# Patient Record
Sex: MALE | Race: WHITE | Employment: OTHER | URBAN - METROPOLITAN AREA
[De-identification: names, ages, dates, MRNs, and addresses within clinical notes are randomized per-mention and may not be internally consistent; named-entity substitution may affect disease eponyms.]

---

## 2024-07-10 ENCOUNTER — HOSPITAL ENCOUNTER (INPATIENT)
Age: 69
LOS: 2 days | Discharge: HOME | End: 2024-07-12
Attending: EMERGENCY MEDICINE | Admitting: SURGERY
Payer: MEDICARE

## 2024-07-10 ENCOUNTER — APPOINTMENT (OUTPATIENT)
Dept: CT IMAGING | Age: 69
End: 2024-07-10
Payer: MEDICARE

## 2024-07-10 ENCOUNTER — APPOINTMENT (OUTPATIENT)
Age: 69
End: 2024-07-10
Payer: MEDICARE

## 2024-07-10 DIAGNOSIS — K35.30 ACUTE APPENDICITIS WITH LOCALIZED PERITONITIS AND ABSCESS, WITHOUT GANGRENE OR PERFORATION: Primary | ICD-10-CM

## 2024-07-10 PROBLEM — K37 APPENDICITIS: Status: ACTIVE | Noted: 2024-07-10

## 2024-07-10 LAB
ABO + RH BLD: NORMAL
ALBUMIN SERPL-MCNC: 4.7 G/DL (ref 3.5–5.2)
ALBUMIN/GLOB SERPL: 2 {RATIO} (ref 1–2.5)
ALP SERPL-CCNC: 68 U/L (ref 40–129)
ALT SERPL-CCNC: 19 U/L (ref 10–50)
ANION GAP SERPL CALCULATED.3IONS-SCNC: 11 MMOL/L (ref 9–16)
ARM BAND NUMBER: NORMAL
AST SERPL-CCNC: 35 U/L (ref 10–50)
BASOPHILS # BLD: 0.03 K/UL (ref 0–0.2)
BASOPHILS NFR BLD: 0 % (ref 0–2)
BILIRUB SERPL-MCNC: 1.8 MG/DL (ref 0–1.2)
BLOOD BANK SAMPLE EXPIRATION: NORMAL
BLOOD GROUP ANTIBODIES SERPL: NEGATIVE
BUN SERPL-MCNC: 15 MG/DL (ref 8–23)
CALCIUM SERPL-MCNC: 9.7 MG/DL (ref 8.6–10.4)
CHLORIDE SERPL-SCNC: 103 MMOL/L (ref 98–107)
CO2 SERPL-SCNC: 22 MMOL/L (ref 20–31)
CREAT SERPL-MCNC: 1.1 MG/DL (ref 0.7–1.2)
ECHO AO ROOT DIAM: 3.3 CM
ECHO AO ROOT INDEX: 1.55 CM/M2
ECHO AV AREA PEAK VELOCITY: 2.9 CM2
ECHO AV AREA VTI: 2.8 CM2
ECHO AV AREA/BSA PEAK VELOCITY: 1.4 CM2/M2
ECHO AV AREA/BSA VTI: 1.3 CM2/M2
ECHO AV MEAN GRADIENT: 2 MMHG
ECHO AV MEAN VELOCITY: 0.7 M/S
ECHO AV PEAK GRADIENT: 5 MMHG
ECHO AV PEAK VELOCITY: 1.1 M/S
ECHO AV VELOCITY RATIO: 0.91
ECHO AV VTI: 19.1 CM
ECHO BSA: 2.15 M2
ECHO LA AREA 2C: 17.4 CM2
ECHO LA AREA 4C: 16.2 CM2
ECHO LA DIAMETER INDEX: 1.6 CM/M2
ECHO LA DIAMETER: 3.4 CM
ECHO LA MAJOR AXIS: 5.2 CM
ECHO LA MINOR AXIS: 4.9 CM
ECHO LA TO AORTIC ROOT RATIO: 1.03
ECHO LA VOL BP: 45 ML (ref 18–58)
ECHO LA VOL MOD A2C: 51 ML (ref 18–58)
ECHO LA VOL MOD A4C: 38 ML (ref 18–58)
ECHO LA VOL/BSA BIPLANE: 21 ML/M2 (ref 16–34)
ECHO LA VOLUME INDEX MOD A2C: 24 ML/M2 (ref 16–34)
ECHO LA VOLUME INDEX MOD A4C: 18 ML/M2 (ref 16–34)
ECHO LV E' LATERAL VELOCITY: 8 CM/S
ECHO LV E' SEPTAL VELOCITY: 7 CM/S
ECHO LV EDV A2C: 73 ML
ECHO LV EDV A4C: 79 ML
ECHO LV EDV INDEX A4C: 37 ML/M2
ECHO LV EDV NDEX A2C: 34 ML/M2
ECHO LV EJECTION FRACTION A2C: 73 %
ECHO LV EJECTION FRACTION A4C: 51 %
ECHO LV EJECTION FRACTION BIPLANE: 65 % (ref 55–100)
ECHO LV ESV A2C: 20 ML
ECHO LV ESV A4C: 39 ML
ECHO LV ESV INDEX A2C: 9 ML/M2
ECHO LV ESV INDEX A4C: 18 ML/M2
ECHO LV FRACTIONAL SHORTENING: 10 % (ref 28–44)
ECHO LV INTERNAL DIMENSION DIASTOLE INDEX: 1.97 CM/M2
ECHO LV INTERNAL DIMENSION DIASTOLIC: 4.2 CM (ref 4.2–5.9)
ECHO LV INTERNAL DIMENSION SYSTOLIC INDEX: 1.78 CM/M2
ECHO LV INTERNAL DIMENSION SYSTOLIC: 3.8 CM
ECHO LV IVSD: 1 CM (ref 0.6–1)
ECHO LV MASS 2D: 127.8 G (ref 88–224)
ECHO LV MASS INDEX 2D: 60 G/M2 (ref 49–115)
ECHO LV POSTERIOR WALL DIASTOLIC: 0.9 CM (ref 0.6–1)
ECHO LV RELATIVE WALL THICKNESS RATIO: 0.43
ECHO LVOT AREA: 3.1 CM2
ECHO LVOT AV VTI INDEX: 0.88
ECHO LVOT DIAM: 2 CM
ECHO LVOT MEAN GRADIENT: 2 MMHG
ECHO LVOT PEAK GRADIENT: 4 MMHG
ECHO LVOT PEAK VELOCITY: 1 M/S
ECHO LVOT STROKE VOLUME INDEX: 24.8 ML/M2
ECHO LVOT SV: 52.8 ML
ECHO LVOT VTI: 16.8 CM
ECHO MV A VELOCITY: 0.71 M/S
ECHO MV AREA VTI: 3 CM2
ECHO MV E DECELERATION TIME (DT): 229 MS
ECHO MV E VELOCITY: 0.45 M/S
ECHO MV E/A RATIO: 0.63
ECHO MV E/E' LATERAL: 5.63
ECHO MV E/E' RATIO (AVERAGED): 6.03
ECHO MV E/E' SEPTAL: 6.43
ECHO MV LVOT VTI INDEX: 1.04
ECHO MV MAX VELOCITY: 0.7 M/S
ECHO MV MEAN GRADIENT: 1 MMHG
ECHO MV MEAN VELOCITY: 0.4 M/S
ECHO MV PEAK GRADIENT: 2 MMHG
ECHO MV VTI: 17.4 CM
ECHO PV MAX VELOCITY: 1.4 M/S
ECHO PV PEAK GRADIENT: 8 MMHG
ECHO RA AREA 4C: 13.3 CM2
ECHO RA END SYSTOLIC VOLUME APICAL 4 CHAMBER INDEX BSA: 12 ML/M2
ECHO RA VOLUME: 25 ML
ECHO RV BASAL DIMENSION: 3.5 CM
ECHO RV FREE WALL PEAK S': 18 CM/S
ECHO RV TAPSE: 2 CM (ref 1.7–?)
EOSINOPHIL # BLD: 0.07 K/UL (ref 0–0.44)
EOSINOPHILS RELATIVE PERCENT: 1 % (ref 1–4)
ERYTHROCYTE [DISTWIDTH] IN BLOOD BY AUTOMATED COUNT: 12.8 % (ref 11.8–14.4)
GFR, ESTIMATED: 73 ML/MIN/1.73M2
GLUCOSE SERPL-MCNC: 117 MG/DL (ref 74–99)
HCT VFR BLD AUTO: 42.1 % (ref 40.7–50.3)
HGB BLD-MCNC: 14.3 G/DL (ref 13–17)
IMM GRANULOCYTES # BLD AUTO: 0.05 K/UL (ref 0–0.3)
IMM GRANULOCYTES NFR BLD: 0 %
LACTIC ACID, WHOLE BLOOD: 1.3 MMOL/L (ref 0.7–2.1)
LYMPHOCYTES NFR BLD: 1.66 K/UL (ref 1.1–3.7)
LYMPHOCYTES RELATIVE PERCENT: 11 % (ref 24–43)
MCH RBC QN AUTO: 31.6 PG (ref 25.2–33.5)
MCHC RBC AUTO-ENTMCNC: 34 G/DL (ref 28.4–34.8)
MCV RBC AUTO: 93.1 FL (ref 82.6–102.9)
MONOCYTES NFR BLD: 1.13 K/UL (ref 0.1–1.2)
MONOCYTES NFR BLD: 8 % (ref 3–12)
NEUTROPHILS NFR BLD: 80 % (ref 36–65)
NEUTS SEG NFR BLD: 11.81 K/UL (ref 1.5–8.1)
NRBC BLD-RTO: 0 PER 100 WBC
PLATELET # BLD AUTO: 164 K/UL (ref 138–453)
PMV BLD AUTO: 9 FL (ref 8.1–13.5)
POTASSIUM SERPL-SCNC: 4 MMOL/L (ref 3.7–5.3)
PROT SERPL-MCNC: 7.5 G/DL (ref 6.6–8.7)
RBC # BLD AUTO: 4.52 M/UL (ref 4.21–5.77)
SODIUM SERPL-SCNC: 136 MMOL/L (ref 136–145)
WBC OTHER # BLD: 14.8 K/UL (ref 3.5–11.3)

## 2024-07-10 PROCEDURE — 85025 COMPLETE CBC W/AUTO DIFF WBC: CPT

## 2024-07-10 PROCEDURE — 93306 TTE W/DOPPLER COMPLETE: CPT | Performed by: INTERNAL MEDICINE

## 2024-07-10 PROCEDURE — 6360000002 HC RX W HCPCS: Performed by: NURSE PRACTITIONER

## 2024-07-10 PROCEDURE — 6370000000 HC RX 637 (ALT 250 FOR IP): Performed by: NURSE PRACTITIONER

## 2024-07-10 PROCEDURE — 86900 BLOOD TYPING SEROLOGIC ABO: CPT

## 2024-07-10 PROCEDURE — 96375 TX/PRO/DX INJ NEW DRUG ADDON: CPT

## 2024-07-10 PROCEDURE — 36415 COLL VENOUS BLD VENIPUNCTURE: CPT

## 2024-07-10 PROCEDURE — 80053 COMPREHEN METABOLIC PANEL: CPT

## 2024-07-10 PROCEDURE — 74177 CT ABD & PELVIS W/CONTRAST: CPT

## 2024-07-10 PROCEDURE — 2500000003 HC RX 250 WO HCPCS: Performed by: NURSE PRACTITIONER

## 2024-07-10 PROCEDURE — 1200000000 HC SEMI PRIVATE

## 2024-07-10 PROCEDURE — 99222 1ST HOSP IP/OBS MODERATE 55: CPT | Performed by: SURGERY

## 2024-07-10 PROCEDURE — 86850 RBC ANTIBODY SCREEN: CPT

## 2024-07-10 PROCEDURE — 86901 BLOOD TYPING SEROLOGIC RH(D): CPT

## 2024-07-10 PROCEDURE — 93306 TTE W/DOPPLER COMPLETE: CPT

## 2024-07-10 PROCEDURE — 2580000003 HC RX 258: Performed by: NURSE PRACTITIONER

## 2024-07-10 PROCEDURE — 96365 THER/PROPH/DIAG IV INF INIT: CPT

## 2024-07-10 PROCEDURE — 93005 ELECTROCARDIOGRAM TRACING: CPT | Performed by: NURSE PRACTITIONER

## 2024-07-10 PROCEDURE — 6370000000 HC RX 637 (ALT 250 FOR IP)

## 2024-07-10 PROCEDURE — 83605 ASSAY OF LACTIC ACID: CPT

## 2024-07-10 PROCEDURE — 6360000002 HC RX W HCPCS: Performed by: EMERGENCY MEDICINE

## 2024-07-10 PROCEDURE — 6360000004 HC RX CONTRAST MEDICATION: Performed by: EMERGENCY MEDICINE

## 2024-07-10 PROCEDURE — 2580000003 HC RX 258: Performed by: EMERGENCY MEDICINE

## 2024-07-10 PROCEDURE — 99285 EMERGENCY DEPT VISIT HI MDM: CPT

## 2024-07-10 RX ORDER — METOPROLOL SUCCINATE 25 MG/1
25 TABLET, EXTENDED RELEASE ORAL 2 TIMES DAILY
COMMUNITY

## 2024-07-10 RX ORDER — RELUGOLIX 120 MG/1
120 TABLET, FILM COATED ORAL DAILY
COMMUNITY

## 2024-07-10 RX ORDER — MAGNESIUM HYDROXIDE/ALUMINUM HYDROXICE/SIMETHICONE 120; 1200; 1200 MG/30ML; MG/30ML; MG/30ML
30 SUSPENSION ORAL EVERY 6 HOURS PRN
Status: DISCONTINUED | OUTPATIENT
Start: 2024-07-10 | End: 2024-07-12 | Stop reason: HOSPADM

## 2024-07-10 RX ORDER — PREDNISONE 5 MG/1
5 TABLET ORAL DAILY
COMMUNITY

## 2024-07-10 RX ORDER — DEXTROSE MONOHYDRATE, SODIUM CHLORIDE, AND POTASSIUM CHLORIDE 50; 1.49; 4.5 G/1000ML; G/1000ML; G/1000ML
INJECTION, SOLUTION INTRAVENOUS CONTINUOUS
Status: DISCONTINUED | OUTPATIENT
Start: 2024-07-10 | End: 2024-07-12

## 2024-07-10 RX ORDER — FENTANYL CITRATE 50 UG/ML
50 INJECTION, SOLUTION INTRAMUSCULAR; INTRAVENOUS ONCE
Status: COMPLETED | OUTPATIENT
Start: 2024-07-10 | End: 2024-07-10

## 2024-07-10 RX ORDER — ENOXAPARIN SODIUM 100 MG/ML
30 INJECTION SUBCUTANEOUS DAILY
Status: DISCONTINUED | OUTPATIENT
Start: 2024-07-10 | End: 2024-07-12 | Stop reason: HOSPADM

## 2024-07-10 RX ORDER — ACETAMINOPHEN 500 MG
1000 TABLET ORAL EVERY 8 HOURS SCHEDULED
Status: DISCONTINUED | OUTPATIENT
Start: 2024-07-10 | End: 2024-07-12 | Stop reason: HOSPADM

## 2024-07-10 RX ORDER — ASPIRIN 81 MG/1
81 TABLET, CHEWABLE ORAL DAILY
COMMUNITY

## 2024-07-10 RX ORDER — ABIRATERONE 500 MG/1
1000 TABLET ORAL DAILY
Status: DISCONTINUED | OUTPATIENT
Start: 2024-07-10 | End: 2024-07-12 | Stop reason: HOSPADM

## 2024-07-10 RX ORDER — OXYCODONE HYDROCHLORIDE 5 MG/1
5 TABLET ORAL EVERY 4 HOURS PRN
Status: DISCONTINUED | OUTPATIENT
Start: 2024-07-10 | End: 2024-07-12 | Stop reason: HOSPADM

## 2024-07-10 RX ORDER — ROSUVASTATIN CALCIUM 20 MG/1
40 TABLET, COATED ORAL DAILY
Status: DISCONTINUED | OUTPATIENT
Start: 2024-07-10 | End: 2024-07-12 | Stop reason: HOSPADM

## 2024-07-10 RX ORDER — AMLODIPINE BESYLATE 2.5 MG/1
2.5 TABLET ORAL DAILY
COMMUNITY

## 2024-07-10 RX ORDER — SENNA AND DOCUSATE SODIUM 50; 8.6 MG/1; MG/1
1 TABLET, FILM COATED ORAL 2 TIMES DAILY
Status: DISCONTINUED | OUTPATIENT
Start: 2024-07-10 | End: 2024-07-12 | Stop reason: HOSPADM

## 2024-07-10 RX ORDER — HYDROXYZINE HYDROCHLORIDE 10 MG/1
10 TABLET, FILM COATED ORAL ONCE
Status: COMPLETED | OUTPATIENT
Start: 2024-07-10 | End: 2024-07-10

## 2024-07-10 RX ORDER — METOPROLOL SUCCINATE 25 MG/1
25 TABLET, EXTENDED RELEASE ORAL 2 TIMES DAILY
Status: DISCONTINUED | OUTPATIENT
Start: 2024-07-10 | End: 2024-07-12 | Stop reason: HOSPADM

## 2024-07-10 RX ORDER — ABIRATERONE 500 MG/1
1000 TABLET ORAL DAILY
COMMUNITY

## 2024-07-10 RX ORDER — POLYETHYLENE GLYCOL 3350 17 G/17G
17 POWDER, FOR SOLUTION ORAL DAILY
Status: DISCONTINUED | OUTPATIENT
Start: 2024-07-10 | End: 2024-07-12 | Stop reason: HOSPADM

## 2024-07-10 RX ORDER — AMLODIPINE BESYLATE 5 MG/1
2.5 TABLET ORAL DAILY
Status: DISCONTINUED | OUTPATIENT
Start: 2024-07-10 | End: 2024-07-12 | Stop reason: HOSPADM

## 2024-07-10 RX ADMIN — POTASSIUM CHLORIDE, DEXTROSE MONOHYDRATE AND SODIUM CHLORIDE: 150; 5; 450 INJECTION, SOLUTION INTRAVENOUS at 16:19

## 2024-07-10 RX ADMIN — ENOXAPARIN SODIUM 30 MG: 100 INJECTION SUBCUTANEOUS at 21:05

## 2024-07-10 RX ADMIN — FENTANYL CITRATE 50 MCG: 50 INJECTION, SOLUTION INTRAMUSCULAR; INTRAVENOUS at 10:46

## 2024-07-10 RX ADMIN — ACETAMINOPHEN 1000 MG: 500 TABLET ORAL at 16:20

## 2024-07-10 RX ADMIN — IOPAMIDOL 75 ML: 755 INJECTION, SOLUTION INTRAVENOUS at 08:00

## 2024-07-10 RX ADMIN — PIPERACILLIN AND TAZOBACTAM 3375 MG: 3; .375 INJECTION, POWDER, LYOPHILIZED, FOR SOLUTION INTRAVENOUS at 18:58

## 2024-07-10 RX ADMIN — AMLODIPINE BESYLATE 2.5 MG: 5 TABLET ORAL at 16:21

## 2024-07-10 RX ADMIN — METOPROLOL SUCCINATE 25 MG: 25 TABLET, EXTENDED RELEASE ORAL at 16:22

## 2024-07-10 RX ADMIN — ROSUVASTATIN CALCIUM 40 MG: 20 TABLET, FILM COATED ORAL at 21:03

## 2024-07-10 RX ADMIN — PIPERACILLIN AND TAZOBACTAM 3375 MG: 3; .375 INJECTION, POWDER, LYOPHILIZED, FOR SOLUTION INTRAVENOUS at 10:50

## 2024-07-10 RX ADMIN — METOPROLOL SUCCINATE 25 MG: 25 TABLET, EXTENDED RELEASE ORAL at 21:33

## 2024-07-10 RX ADMIN — HYDROXYZINE HYDROCHLORIDE 10 MG: 10 TABLET ORAL at 21:03

## 2024-07-10 ASSESSMENT — PAIN SCALES - GENERAL
PAINLEVEL_OUTOF10: 5
PAINLEVEL_OUTOF10: 4
PAINLEVEL_OUTOF10: 5

## 2024-07-10 ASSESSMENT — PAIN DESCRIPTION - DESCRIPTORS: DESCRIPTORS: SHARP;PRESSURE

## 2024-07-10 ASSESSMENT — ENCOUNTER SYMPTOMS
BLOOD IN STOOL: 0
ABDOMINAL DISTENTION: 1
RESPIRATORY NEGATIVE: 1
CONSTIPATION: 1
NAUSEA: 1
ABDOMINAL PAIN: 1
EYES NEGATIVE: 1
VOMITING: 0

## 2024-07-10 ASSESSMENT — PAIN - FUNCTIONAL ASSESSMENT: PAIN_FUNCTIONAL_ASSESSMENT: 0-10

## 2024-07-10 ASSESSMENT — PAIN DESCRIPTION - ORIENTATION: ORIENTATION: LOWER;RIGHT

## 2024-07-10 ASSESSMENT — PAIN DESCRIPTION - LOCATION: LOCATION: ABDOMEN

## 2024-07-10 NOTE — ED PROVIDER NOTES
[facility] ED  Emergency Department Encounter  Emergency Medicine      Pt Name:Patrick Gallegos  MRN: [id number]  Birthdate 1955  Date of evaluation: 7/10/24  PCP:  No primary care provider on file.  6:38 AM EDT      CHIEF COMPLAINT       Chief Complaint   Patient presents with    Abdominal Pain     Hx of appendicitis        HISTORY OF PRESENT ILLNESS  (Location/Symptom, Timing/Onset, Context/Setting, Quality, Duration, Modifying Factors, Severity.)      Patrick Gallegos is a 69 y.o. male who presents with h/o prostate CAm h/o v fib arrest in 2021, has cardiac stents, and [employer] AICD in place, present with RLQ abdominal pain for the past day, he did ttake oxycodone for the pain and it is 5/10 at this time, and he also feels abdominal distention, no nausea or vomiting, no fevers.  He does have a h/p appendicitis in 6/11/2024, he was admitted at [place], [place], and the decision was made to treat with abx, and then have him follow up outpatient, which he is scheduled to see surgeon in 2 weeks. Patient reports on vacation in [facility] and started having abdominal pain last night. And continues to today. And thinks it is appendicitis again. He is accompanied by his wife. Patient is on anticoagulated, and takes eliquis which he did not take last night     PAST MEDICAL / SURGICAL / SOCIAL / FAMILY HISTORY      has a past medical history of Appendicitis, Cardiac arrest with ventricular fibrillation (HCC), Coronary artery disease, Heart attack (HCC), History of heart artery stent, HTN (hypertension), and Presence of combination internal cardiac defibrillator (ICD) and pacemaker.       has no past surgical history on file.      Social History     Socioeconomic History    Marital status:      Spouse name: Not on file    Number of children: Not on file    Years of education: Not on file    Highest education level: Not on file   Occupational History    Not on file   Tobacco Use    Smoking status: Never

## 2024-07-10 NOTE — H&P
General Surgery  Consult    PATIENT NAME: Patrick Gallegos  AGE: 69 y.o.  MEDICAL RECORD NO. [id number]  DATE: 7/10/2024  SURGEON: amy  PRIMARY CARE PHYSICIAN: No primary care provider on file.    Patient evaluated at the request of  Dr. decker  Reason for evaluation: appendicitis    Patient information was obtained from patient.  History/Exam limitations: none.    Acute appendicitis  Hx appendicitis treated with antbx  Hx cad, ac, stents  Colonoscopy 10 years ago- colonoscopy scheduled end of moth at his home area   Petey will evaluate need for abscess drainage and antibiotics vs interval appendenctomy   He has clearance for appy from cardiology (see media) as he was going to have intervval appy end of month    HISTORY:   History of Chief Complaint:    Patrick Gallegos is a 69 y.o. male who presents with abd pain. Co of abd pain and bloating x several days.  Pt has had hx of appendicitis with abd pain that first started last december and required hospital stay for iv antibiotics. Pt stated the pain feels similar to then. Appendix is still present at this time. Pt states they are from [place] and are in the are visiting , on eliquis.   last ate was yesterday around 1700      Elevated wbc., bloating, mild constipation      Hx of htn, cardiac arrest and 3 stents placed, pacemaker/defibrillator combo       Past Medical History   has a past medical history of Appendicitis, Cardiac arrest with ventricular fibrillation (HCC), Coronary artery disease, Heart attack (HCC), History of heart artery stent, HTN (hypertension), and Presence of combination internal cardiac defibrillator (ICD) and pacemaker.  Past Surgical History   has no past surgical history on file.  Medications  Prior to Admission medications    Medication Sig Start Date End Date Taking? Authorizing Provider   apixaban (ELIQUIS) 5 MG TABS tablet Take 1 tablet by mouth 2 times daily   Yes Provider, MD Sallie   metoprolol succinate (TOPROL XL) 25 MG extended

## 2024-07-10 NOTE — PROGRESS NOTES
Pt wife to bring [employer] to nurse tomorrow to bring to pharmacy to tag so pt may receive while in hospital.

## 2024-07-10 NOTE — ED NOTES
The following labs were labeled with appropriate pt sticker and tubed to lab:     [] Blue     [] Lavender   [] on ice  [] Green/yellow  [x] Green/black [] on ice  [] Grey  [] on ice  [] Yellow  [] Red  [] Pink  [] Type/ Screen  [] ABG  [] VBG    [] COVID-19 swab    [] Rapid  [] PCR  [] Flu swab  [] Peds Viral Panel     [] Urine Sample  [] Fecal Sample  [] Pelvic Cultures  [] Blood Cultures  [] X 2  [] STREP Cultures  [] Wound Cultures

## 2024-07-10 NOTE — ED NOTES
(Oral)   Resp 15   Wt 93 kg (205 lb)   SpO2 94%        LDAs:   Peripheral IV 07/10/24 Left;Posterior Forearm (Active)   Site Assessment Clean, dry & intact 07/10/24 0642   Line Status Brisk blood return;Flushed;Specimen collected 07/10/24 0642   Line Care Connections checked and tightened 07/10/24 0642   Phlebitis Assessment No symptoms 07/10/24 0642   Infiltration Assessment 0 07/10/24 0642   Dressing Status New dressing applied 07/10/24 0642   Dressing Type Transparent 07/10/24 0642   Dressing Intervention New 07/10/24 0642       Ambulatory Status:  Presents to emergency department  because of falls (Syncope, seizure, or loss of consciousness): No, Age > 70: No, Altered Mental Status, Intoxication with alcohol or substance confusion (Disorientation, impaired judgment, poor safety awaremess, or inability to follow instructions): No, Impaired Mobility: Ambulates or transfers with assistive devices or assistance; Unable to ambulate or transer.: No, Nursing Judgement: No    Diagnosis:  DISPOSITION Admitted 07/10/2024 12:40:56 PM   Final diagnoses:   Acute appendicitis with localized peritonitis and abscess, without gangrene or perforation        Consults:  IP CONSULT TO GENERAL SURGERY  IP CONSULT TO CARDIOLOGY     Treatment Team:   Treatment Team: Attending Provider: Edenilson Angelo MD; Registered Nurse: Markie Lindsey RN; Consulting Physician: Edenilson Angelo MD; Surgeon: Edenilson Angelo MD; Consulting Physician: Manisha Rubalcava MD    Treatment:  ED Course as of 07/10/24 1544   Wed Jul 10, 2024   0735 WBC(!): 14.8 [CE]   0937 Patient still awaiting ct results, still comfortable on exam,  [CE]   1021 CT scan result reviewed, will cover with Zosyn, and consult general surgery and plan on admission.  Patient was updated on results. [CE]   1107 Spoke with mikala from gen surg who will eval the patient [CE]   1146 Spoke with Dr. Angelo, and patient to be admitted, and cover with abx at this time [CE]       ED Course User Index  [CE] Paula Iglesias DO          Skin Assessment:        Pain Score:  Pain Assessment  Pain Assessment: 0-10  Pain Level: 5  Pain Location: Abdomen  Pain Orientation: Lower, Right  Pain Descriptors: Sharp, Pressure      SOCIAL HISTORY       Social History     Socioeconomic History    Marital status:      Spouse name: None    Number of children: None    Years of education: None    Highest education level: None   Tobacco Use    Smoking status: Never    Smokeless tobacco: Never   Substance and Sexual Activity    Alcohol use: Not Currently    Drug use: Never       FAMILY HISTORY     History reviewed. No pertinent family history.    ALLERGIES     Patient has no known allergies.    CURRENT MEDICATIONS       Previous Medications    ABIRATERONE ACETATE 500 MG TABS    Take 2 tablets by mouth daily    AMLODIPINE (NORVASC) 2.5 MG TABLET    Take 1 tablet by mouth daily    APIXABAN (ELIQUIS) 5 MG TABS TABLET    Take 1 tablet by mouth 2 times daily    ASPIRIN 81 MG CHEWABLE TABLET    Take 1 tablet by mouth daily    METOPROLOL SUCCINATE (TOPROL XL) 25 MG EXTENDED RELEASE TABLET    Take 1 tablet by mouth 2 times daily    PREDNISONE (DELTASONE) 5 MG TABLET    Take 1 tablet by mouth daily    RELUGOLIX (ORGOVYX) 120 MG TABS    Take 120 mg by mouth daily    ROSUVASTATIN CALCIUM 40 MG CPSP    Take 40 mg by mouth daily     Orders Placed This Encounter   Medications    iopamidol (ISOVUE-370) 76 % injection 75 mL    piperacillin-tazobactam (ZOSYN) 3,375 mg in sodium chloride 0.9 % 50 mL IVPB (mini-bag)     Order Specific Question:   Antimicrobial Indications     Answer:   Other     Order Specific Question:   Other Abx Indication     Answer:   acute appendicitis    fentaNYL (SUBLIMAZE) injection 50 mcg    Abiraterone Acetate TABS 1,000 mg    amLODIPine (NORVASC) tablet 2.5 mg    metoprolol succinate (TOPROL XL) extended release tablet 25 mg    Relugolix TABS 120 mg    rosuvastatin (CRESTOR) tablet 40 mg

## 2024-07-10 NOTE — ED TRIAGE NOTES
Pt arriving to ed 03 via triage with wife  Co of abd pain and bloating for a few days now  Pt has had hx of appendicitis with abd pain that first started last december and required hospital stay for iv antibiotics. Pt stated the pain feels similar to then. Appendix is still present at this time  Hx of htn, cardiac arrest and 3 stents placed, pacemaker/defibrillator combo   Pt states they are from [place] and are in the are visiting when the pain came on   Last ate was yesterday around 1700   Pt is resting on stretcher with call light within reach.  Breathing is non labored and no acute distress is noted.   Will continue to follow plan of care

## 2024-07-11 VITALS
WEIGHT: 204 LBS | SYSTOLIC BLOOD PRESSURE: 149 MMHG | TEMPERATURE: 98.1 F | HEIGHT: 71 IN | DIASTOLIC BLOOD PRESSURE: 85 MMHG | OXYGEN SATURATION: 96 % | HEART RATE: 93 BPM | BODY MASS INDEX: 28.56 KG/M2 | RESPIRATION RATE: 17 BRPM

## 2024-07-11 PROBLEM — Z01.818 PRE-OP EVALUATION: Status: ACTIVE | Noted: 2024-07-11

## 2024-07-11 PROBLEM — I25.10 CORONARY ARTERY DISEASE INVOLVING NATIVE CORONARY ARTERY OF NATIVE HEART WITHOUT ANGINA PECTORIS: Status: ACTIVE | Noted: 2024-07-11

## 2024-07-11 PROBLEM — Z95.810 ICD (IMPLANTABLE CARDIOVERTER-DEFIBRILLATOR) IN PLACE: Status: ACTIVE | Noted: 2024-07-11

## 2024-07-11 LAB
ALBUMIN SERPL-MCNC: 3.8 G/DL (ref 3.5–5.2)
ALBUMIN/GLOB SERPL: 1 {RATIO} (ref 1–2.5)
ALP SERPL-CCNC: 60 U/L (ref 40–129)
ALT SERPL-CCNC: 14 U/L (ref 10–50)
ANION GAP SERPL CALCULATED.3IONS-SCNC: 8 MMOL/L (ref 9–16)
AST SERPL-CCNC: 23 U/L (ref 10–50)
BASOPHILS # BLD: 0 K/UL (ref 0–0.2)
BASOPHILS NFR BLD: 0 % (ref 0–2)
BILIRUB DIRECT SERPL-MCNC: 0.6 MG/DL (ref 0–0.2)
BILIRUB INDIRECT SERPL-MCNC: 0.8 MG/DL (ref 0–1)
BILIRUB SERPL-MCNC: 1.4 MG/DL (ref 0–1.2)
BUN SERPL-MCNC: 10 MG/DL (ref 8–23)
CALCIUM SERPL-MCNC: 8.7 MG/DL (ref 8.6–10.4)
CHLORIDE SERPL-SCNC: 108 MMOL/L (ref 98–107)
CO2 SERPL-SCNC: 24 MMOL/L (ref 20–31)
CREAT SERPL-MCNC: 0.9 MG/DL (ref 0.7–1.2)
EOSINOPHIL # BLD: 0.14 K/UL (ref 0–0.44)
EOSINOPHILS RELATIVE PERCENT: 2 % (ref 1–4)
ERYTHROCYTE [DISTWIDTH] IN BLOOD BY AUTOMATED COUNT: 12.6 % (ref 11.8–14.4)
GFR, ESTIMATED: 89 ML/MIN/1.73M2
GLOBULIN SER CALC-MCNC: 2.7 G/DL
GLUCOSE SERPL-MCNC: 117 MG/DL (ref 74–99)
HCT VFR BLD AUTO: 39 % (ref 40.7–50.3)
HGB BLD-MCNC: 12.8 G/DL (ref 13–17)
IMM GRANULOCYTES # BLD AUTO: 0 K/UL (ref 0–0.3)
IMM GRANULOCYTES NFR BLD: 0 %
LYMPHOCYTES NFR BLD: 0.78 K/UL (ref 1.1–3.7)
LYMPHOCYTES RELATIVE PERCENT: 11 % (ref 24–43)
MCH RBC QN AUTO: 31 PG (ref 25.2–33.5)
MCHC RBC AUTO-ENTMCNC: 32.8 G/DL (ref 28.4–34.8)
MCV RBC AUTO: 94.4 FL (ref 82.6–102.9)
MONOCYTES NFR BLD: 0.57 K/UL (ref 0.1–1.2)
MONOCYTES NFR BLD: 8 % (ref 3–12)
MORPHOLOGY: NORMAL
NEUTROPHILS NFR BLD: 79 % (ref 36–65)
NEUTS SEG NFR BLD: 5.61 K/UL (ref 1.5–8.1)
NRBC BLD-RTO: 0 PER 100 WBC
PLATELET # BLD AUTO: 121 K/UL (ref 138–453)
PMV BLD AUTO: 9.1 FL (ref 8.1–13.5)
POTASSIUM SERPL-SCNC: 4.1 MMOL/L (ref 3.7–5.3)
PROT SERPL-MCNC: 6.5 G/DL (ref 6.6–8.7)
RBC # BLD AUTO: 4.13 M/UL (ref 4.21–5.77)
SODIUM SERPL-SCNC: 140 MMOL/L (ref 136–145)
TROPONIN I SERPL HS-MCNC: 13 NG/L (ref 0–22)
WBC OTHER # BLD: 7.1 K/UL (ref 3.5–11.3)

## 2024-07-11 PROCEDURE — 36415 COLL VENOUS BLD VENIPUNCTURE: CPT

## 2024-07-11 PROCEDURE — 80048 BASIC METABOLIC PNL TOTAL CA: CPT

## 2024-07-11 PROCEDURE — 84484 ASSAY OF TROPONIN QUANT: CPT

## 2024-07-11 PROCEDURE — 99222 1ST HOSP IP/OBS MODERATE 55: CPT | Performed by: INTERNAL MEDICINE

## 2024-07-11 PROCEDURE — 2500000003 HC RX 250 WO HCPCS: Performed by: NURSE PRACTITIONER

## 2024-07-11 PROCEDURE — 6360000002 HC RX W HCPCS: Performed by: NURSE PRACTITIONER

## 2024-07-11 PROCEDURE — 85025 COMPLETE CBC W/AUTO DIFF WBC: CPT

## 2024-07-11 PROCEDURE — 6370000000 HC RX 637 (ALT 250 FOR IP): Performed by: NURSE PRACTITIONER

## 2024-07-11 PROCEDURE — 1200000000 HC SEMI PRIVATE

## 2024-07-11 PROCEDURE — 99232 SBSQ HOSP IP/OBS MODERATE 35: CPT | Performed by: SURGERY

## 2024-07-11 PROCEDURE — 80076 HEPATIC FUNCTION PANEL: CPT

## 2024-07-11 PROCEDURE — 2580000003 HC RX 258: Performed by: NURSE PRACTITIONER

## 2024-07-11 RX ADMIN — AMLODIPINE BESYLATE 2.5 MG: 5 TABLET ORAL at 09:35

## 2024-07-11 RX ADMIN — DOCUSATE SODIUM 50 MG AND SENNOSIDES 8.6 MG 1 TABLET: 8.6; 5 TABLET, FILM COATED ORAL at 09:33

## 2024-07-11 RX ADMIN — METOPROLOL SUCCINATE 25 MG: 25 TABLET, EXTENDED RELEASE ORAL at 20:40

## 2024-07-11 RX ADMIN — PIPERACILLIN AND TAZOBACTAM 3375 MG: 3; .375 INJECTION, POWDER, LYOPHILIZED, FOR SOLUTION INTRAVENOUS at 20:41

## 2024-07-11 RX ADMIN — ENOXAPARIN SODIUM 30 MG: 100 INJECTION SUBCUTANEOUS at 09:33

## 2024-07-11 RX ADMIN — METOPROLOL SUCCINATE 25 MG: 25 TABLET, EXTENDED RELEASE ORAL at 09:35

## 2024-07-11 RX ADMIN — ACETAMINOPHEN 1000 MG: 500 TABLET ORAL at 09:36

## 2024-07-11 RX ADMIN — POLYETHYLENE GLYCOL 3350 17 G: 17 POWDER, FOR SOLUTION ORAL at 09:36

## 2024-07-11 RX ADMIN — ROSUVASTATIN CALCIUM 40 MG: 20 TABLET, FILM COATED ORAL at 09:34

## 2024-07-11 RX ADMIN — PIPERACILLIN AND TAZOBACTAM 3375 MG: 3; .375 INJECTION, POWDER, LYOPHILIZED, FOR SOLUTION INTRAVENOUS at 09:42

## 2024-07-11 RX ADMIN — ACETAMINOPHEN 1000 MG: 500 TABLET ORAL at 17:00

## 2024-07-11 RX ADMIN — PIPERACILLIN AND TAZOBACTAM 3375 MG: 3; .375 INJECTION, POWDER, LYOPHILIZED, FOR SOLUTION INTRAVENOUS at 02:32

## 2024-07-11 RX ADMIN — POTASSIUM CHLORIDE, DEXTROSE MONOHYDRATE AND SODIUM CHLORIDE: 150; 5; 450 INJECTION, SOLUTION INTRAVENOUS at 19:57

## 2024-07-11 NOTE — CARE COORDINATION
Case Management Assessment  Initial Evaluation    Date/Time of Evaluation: 7/11/2024 1006  Assessment Completed by: Susan Doss    If patient is discharged prior to next notation, then this note serves as note for discharge by case management.    Patient Name: Patrick Gallegos                   YOB: 1955  Diagnosis: Appendicitis [K37]  Acute appendicitis with localized peritonitis and abscess, without gangrene or perforation [K35.30]                   Date / Time: 7/10/2024  6:22 AM    Patient Admission Status: Inpatient   Readmission Risk (Low < 19, Mod (19-27), High > 27): Readmission Risk Score: 7.3    Current PCP: No primary care provider on file.  PCP verified by CM? (P) Yes (Dr Merrill Nieto)    Chart Reviewed: Yes      History Provided by: (P) Patient  Patient Orientation: (P) Alert and Oriented    Patient Cognition: (P) Alert    Hospitalization in the last 30 days (Readmission):  No    If yes, Readmission Assessment in CM Navigator will be completed.    Advance Directives:      Code Status: Full Code   Patient's Primary Decision Maker is: (P) Legal Next of Kin      Discharge Planning:    Patient lives with: (P) Spouse/Significant Other Type of Home: (P) House  Primary Care Giver: (P) Self  Patient Support Systems include: (P) Spouse/Significant Other   Current Financial resources: (P) Medicare, Other (Comment) (has BC/BS)  Current community resources: (P) None  Current services prior to admission: (P) None            Current DME:              Type of Home Care services:  (P) None    ADLS  Prior functional level: (P) Independent in ADLs/IADLs  Current functional level: (P) Assistance with the following:, Bathing, Dressing, Toileting, Cooking, Housework, Shopping, Mobility, Other (see comment) (d/t hospitalization)    PT AM-PAC:   /24  OT AM-PAC:   /24    Family can provide assistance at DC: (P) Yes  Would you like Case Management to discuss the discharge plan with any other family members/significant

## 2024-07-11 NOTE — CONSULTS
Attestation signed by      Attending Physician Statement:    I have discussed the care of  Patrick Gallegos , including pertinent history and exam findings, with the Cardiology fellow/resident.     I have seen and examined the patient and the key elements of all parts of the encounter have been performed by me. I agree with the assessment, plan and orders as documented by the fellow/resident, after I modified exam findings and plan of treatments, and the final version is my approved version of the assessment.     Additional Comments: Moderate risk for surgery. TTE showed preserved LV systolic function. H/o CAD, VF, ICD. Obtain ICD interrogation    Emanuel Pena MD               [facility]   Admission Note                 Patient's name:  Patrick Gallegos  Medical Record Number: [id number]  Patient's account/billing number: [id number]  Patient's YOB: 1955  Age: 69 y.o.  Date of Admission: 7/10/2024  6:22 AM  Date of History and Physical Examination: 7/11/2024  Primary Care Physician: No primary care provider on file.    Code Status: Full Code    CHIEF COMPLAINT: Abdominal pain, found to have appendicular abscess    CONSULT REASON: Surgical optimization for ex lap for appendicular abscess    HISTORY OF PRESENT ILLNESS:      History was obtained from chart review and the patient.      Patrick Gallegos is a 69 y.o. with past medical history of V-fib cardiac arrest back in 2021 status post [employer] AICD placement, history of 3 stent in 2016 and 2018, followed by normal cardiac cath after V-fib arrest back in 2021 presented himself to the emergency room with a chief complaint of abdominal pain, distention found to have appendicular abscess with evidence of appendicitis on CT imaging.  General surgery is following.  Currently the plan is to treat with IV antibiotic and fluid.  However there is a concern that patient might need ex lap for the appendicular abscess drainage.  Patient at home takes  small collection of fluid along the appendix proximally, measuring 2.0 x 1.4 x 0.9 cm on series 2, image 147.  No evidence of free air. Pelvis: No acute abnormality. Peritoneum/Retroperitoneum: Moderate atherosclerosis.  Patent vasculature. No lymphadenopathy.  Trace fluid along the right pelvic sidewall and pericolic gutter.  Tiny fat containing periumbilical hernia.  No free air. Bones/Soft Tissues: No acute osseous abnormality.     1. Findings compatible with acute appendicitis, without free intraperitoneal air.  A 2.0 x 1.4 x 0.9 cm fluid collection along the base of the appendix is suspicious for a small abscess. 2. Trace right hydronephrosis and proximal hydroureter, most likely related to the right lower quadrant inflammatory process. 3. Coronary atherosclerosis.       Last EKG:   Normal sinus rhythm  Low voltage QRS  Septal infarct , age undetermined  Inferior infarct , age undetermined    Last Echo: YESTERDAY  Left Ventricle Normal left ventricular systolic function with a visually estimated EF of 55 - 60%. Left ventricle size is normal. Normal wall thickness. Normal wall motion. Normal diastolic function.   Left Atrium Left atrium size is normal.   Right Ventricle Right ventricle size is normal. Lead present in the right ventricle. Normal systolic function.   Right Atrium Lead present in the right atrium. Right atrium size is normal.   Aortic Valve Trileaflet valve. No regurgitation. No stenosis.   Mitral Valve Valve structure is normal. Trace regurgitation. No stenosis noted.   Tricuspid Valve Valve structure is normal. No regurgitation. No stenosis noted.   Pulmonic Valve Valve structure is normal. Trace regurgitation. No stenosis noted.   Aorta Normal sized aortic root.   IVC/Hepatic Veins IVC was not assessed due to poor image quality.   Pericardium No pericardial effusion.       Last Stress test/ LHC:   Patient had 3 stent placed in 2016 and 2018.  Also told me that he had an unremarkable cardiac cath

## 2024-07-11 NOTE — PROGRESS NOTES
PROGRESS NOTE          PATIENT NAME: Patrick Gallegos  MEDICAL RECORD NO. [id number]  DATE: 2024  SURGEON: Dr. Angelo  PRIMARY CARE PHYSICIAN: No primary care provider on file.    HD: # 1    ASSESSMENT    Patient Active Problem List   Diagnosis    Acute appendicitis with localized peritonitis and abscess, without gangrene or perforation    Appendicitis    Pre-op evaluation    Coronary artery disease involving native coronary artery of native heart without angina pectoris    ICD (implantable cardioverter-defibrillator) in place       MEDICAL DECISION MAKING AND PLAN    Continue IV antibiotics for 1 additional day.  If patient clinically continues to improve, anticipate discharge tomorrow 712 with oral antibiotics.  Patient can follow-up with his surgeon in [place].      Chief Complaint: \"Abdominal pain\"    SUBJECTIVE    Patrick Gallegos was seen and evaluated at bedside.  Afebrile, vital signs in normal limits.  Patient white count has resolved and his pain is significantly improved.      OBJECTIVE  VITALS: Temp: Temp: 97.8 °F (36.6 °C)Temp  Av.5 °F (36.4 °C)  Min: 97.3 °F (36.3 °C)  Max: 97.8 °F (36.6 °C) BP Systolic (24hrs), Av , Min:126 , Max:164   Diastolic (24hrs), Av, Min:78, Max:93   Pulse Pulse  Av.3  Min: 72  Max: 93 Resp Resp  Av.6  Min: 10  Max: 16 Pulse ox SpO2  Av.2 %  Min: 93 %  Max: 97 %  GENERAL: alert, no distress  NEURO: GCS 15  HEENT: Normocephalic  : deferred  LUNGS: Normal effort with respirations  HEART: Regular rate and rhythm  ABDOMEN: Soft, nondistended, mild tenderness to palpation in right lower quadrant, nonperitoneal no rebound tenderness  EXTREMITY: no cyanosis, clubbing or edema    I/O last 3 completed shifts:  In: 199.7 [I.V.:199.7]  Out: -     Drain/tube output:  In: 199.7 [I.V.:199.7]  Out: -     LAB:  CBC:   Recent Labs     07/10/24  0657 24  0724   WBC 14.8* 7.1   HGB 14.3 12.8*   HCT 42.1 39.0*   MCV 93.1 94.4    121*     BMP:   Recent

## 2024-07-12 PROCEDURE — 99231 SBSQ HOSP IP/OBS SF/LOW 25: CPT | Performed by: SURGERY

## 2024-07-12 PROCEDURE — 6370000000 HC RX 637 (ALT 250 FOR IP): Performed by: NURSE PRACTITIONER

## 2024-07-12 PROCEDURE — 2500000003 HC RX 250 WO HCPCS: Performed by: NURSE PRACTITIONER

## 2024-07-12 PROCEDURE — 6360000002 HC RX W HCPCS: Performed by: NURSE PRACTITIONER

## 2024-07-12 PROCEDURE — 2580000003 HC RX 258: Performed by: NURSE PRACTITIONER

## 2024-07-12 RX ORDER — AMOXICILLIN AND CLAVULANATE POTASSIUM 875; 125 MG/1; MG/1
1 TABLET, FILM COATED ORAL 2 TIMES DAILY
Qty: 8 TABLET | Refills: 0 | Status: SHIPPED | OUTPATIENT
Start: 2024-07-12 | End: 2024-07-16

## 2024-07-12 RX ADMIN — PIPERACILLIN AND TAZOBACTAM 3375 MG: 3; .375 INJECTION, POWDER, LYOPHILIZED, FOR SOLUTION INTRAVENOUS at 05:09

## 2024-07-12 RX ADMIN — ABIRATERONE 1000 MG: 500 TABLET ORAL at 08:23

## 2024-07-12 RX ADMIN — AMLODIPINE BESYLATE 2.5 MG: 5 TABLET ORAL at 08:20

## 2024-07-12 RX ADMIN — POTASSIUM CHLORIDE, DEXTROSE MONOHYDRATE AND SODIUM CHLORIDE: 150; 5; 450 INJECTION, SOLUTION INTRAVENOUS at 05:55

## 2024-07-12 RX ADMIN — ROSUVASTATIN CALCIUM 40 MG: 20 TABLET, FILM COATED ORAL at 08:19

## 2024-07-12 RX ADMIN — ENOXAPARIN SODIUM 30 MG: 100 INJECTION SUBCUTANEOUS at 08:18

## 2024-07-12 RX ADMIN — ACETAMINOPHEN 1000 MG: 500 TABLET ORAL at 08:18

## 2024-07-12 RX ADMIN — METOPROLOL SUCCINATE 25 MG: 25 TABLET, EXTENDED RELEASE ORAL at 08:19

## 2024-07-12 NOTE — PLAN OF CARE
Problem: Pain  Goal: Verbalizes/displays adequate comfort level or baseline comfort level  7/11/2024 1111 by Ana Cristina Fraser, RN  Outcome: Progressing     Problem: ABCDS Injury Assessment  Goal: Absence of physical injury  7/11/2024 1111 by Ana Cristina Fraser, RN  Outcome: Progressing     Problem: Safety - Adult  Goal: Free from fall injury  Outcome: Progressing     
  Problem: Pain  Goal: Verbalizes/displays adequate comfort level or baseline comfort level  7/12/2024 0845 by Yanique Champagne, RN  Outcome: Completed  7/12/2024 0502 by Dora Cruz RN  Outcome: Progressing     Problem: ABCDS Injury Assessment  Goal: Absence of physical injury  7/12/2024 0845 by Yanique Champagne, RN  Outcome: Completed  7/12/2024 0502 by Dora Cruz RN  Outcome: Progressing     Problem: Safety - Adult  Goal: Free from fall injury  7/12/2024 0845 by Yanique Champagne, RN  Outcome: Completed  7/12/2024 0502 by Dora Cruz RN  Outcome: Progressing     
  Problem: Pain  Goal: Verbalizes/displays adequate comfort level or baseline comfort level  Outcome: Progressing     Problem: ABCDS Injury Assessment  Goal: Absence of physical injury  Outcome: Progressing     Problem: Safety - Adult  Goal: Free from fall injury  Outcome: Progressing     
  Problem: Pain  Goal: Verbalizes/displays adequate comfort level or baseline comfort level  Outcome: Progressing     Problem: ABCDS Injury Assessment  Goal: Absence of physical injury  Outcome: Progressing  Flowsheets (Taken 7/10/2024 2223)  Absence of Physical Injury: Implement safety measures based on patient assessment     
No

## 2024-07-12 NOTE — DISCHARGE INSTRUCTIONS
Discharge Instructions for General Surgery    Resume activity as tolerated, No operating heavy equipment while using narcotics    F/u with your surgeon in [place] in 1-2 weeks Call your doctor for the following:  Chills  Temperature greater than 101  Pain that is not tolerable despite taking pain medicine as ordered There is increased swelling, redness or warmth at surgical site There is increased drainage or bleeding from surgical site    Recommended diet: regular diet  Depending on your injuries, your doctor may want you to follow a specific diet. Some pain medicine can cause constipation . To avoid this problem:  Drink plenty of fluids.  Eat foods high in fiber , such as:  Whole grain cereals and bread  Fruits and vegtables   Legumes (eg, beans, lentils)      If you are still having problems, talk to your doctor about using laxatives or stool softeners.    General questions or concerns call [phone] for the General Surgery Clinic.  If needed, the clinic fax number is [phone]

## 2024-07-12 NOTE — DISCHARGE SUMMARY
DISCHARGE SUMMARY:    PATIENT NAME:  Patrick Gallegos  YOB: 1955  MEDICAL RECORD NO. [id number]  DATE: 07/12/24  PRIMARY CARE PHYSICIAN: No primary care provider on file.  ADMIT DATE:  7/10/2024    DISCHARGE DATE:  7/12/2024  DISPOSITION:  home  ADMITTING DIAGNOSIS:   Acute perforated appendicitis    DIAGNOSIS:   Patient Active Problem List   Diagnosis    Acute appendicitis with localized peritonitis and abscess, without gangrene or perforation    Appendicitis    Pre-op evaluation    Coronary artery disease involving native coronary artery of native heart without angina pectoris    ICD (implantable cardioverter-defibrillator) in place       CONSULTANTS:  Cardiology    PROCEDURES:   None    HOSPITAL COURSE:   Patrick Gallegos is a 69 y.o. male who was admitted on 7/10/2024 with increased abdominal distension and known perforated appendicitis treated with antibiotics on June 12th. Cardiology was consulted in anticipation for possible surgical optimization due to his extensive cardiac history.  He was treated with IV antibiotics and fluids. His WBC improved from 14.8 to 7.1. His abdominal pain improved as well. He was able to tolerate clear liquids and was advanced to regular as tolerated.     Hospital Course:    Labs and imaging were followed daily.      On day of discharge Patrick Gallegos  was tolerating a regular diet  had adequate analgesia on oral medications  had no signs of complication.  He was deemed medically stable for discharge to Home    PHYSICAL EXAMINATION:        Discharge Vitals:  height is 1.803 m (5' 11\") and weight is 92.5 kg (204 lb). His oral temperature is 98.1 °F (36.7 °C). His blood pressure is 149/85 (abnormal) and his pulse is 93. His respiration is 17 and oxygen saturation is 96%.   Exam on day of discharge:  Physical Exam  HENT:      Head: Normocephalic and atraumatic.   Cardiovascular:      Rate and Rhythm: Normal rate.   Pulmonary:      Effort: Pulmonary effort is normal.   Abdominal:       [address] [place] [address]      Phone: [phone]   amoxicillin-clavulanate 875-125 MG per tablet       Diet: Regular diet as tolerated  Activity: As instructed WEIGHT BEARING STATUS: Weight bearing as tolerated    DISPOSITION: Home    Follow-up:  Follow up with surgeon near home in [place] in the next 1-2 weeks      SIGNED:  Michelle Maldonado PA-C   7/12/2024, 1:57 PM  Time Spent for discharge: 35 minutes

## 2024-07-12 NOTE — PROGRESS NOTES
PROGRESS NOTE          PATIENT NAME: Patrick Gallegso  MEDICAL RECORD NO. [id number]  DATE: 2024  SURGEON: Dr. Angelo  PRIMARY CARE PHYSICIAN: No primary care provider on file.    HD: # 2    ASSESSMENT    Patient Active Problem List   Diagnosis    Acute appendicitis with localized peritonitis and abscess, without gangrene or perforation    Appendicitis    Pre-op evaluation    Coronary artery disease involving native coronary artery of native heart without angina pectoris    ICD (implantable cardioverter-defibrillator) in place       MEDICAL DECISION MAKING AND PLAN    Advance to regular diet as tolerated  Transition to oral antibiotics for discharge.  Patient can follow-up with his surgeon in [place].    Chief Complaint: \"Abdominal pain\"    SUBJECTIVE    Patrick Gallegos was seen and evaluated at bedside.  Afebrile, vital signs in normal limits.  Tolerating clear liquids with no nausea/vomiting. His pain has significantly improved.    OBJECTIVE  VITALS: Temp: Temp: 98.1 °F (36.7 °C)Temp  Av.9 °F (36.6 °C)  Min: 97.7 °F (36.5 °C)  Max: 98.1 °F (36.7 °C) BP Systolic (24hrs), Av , Min:149 , Max:160   Diastolic (24hrs), Av, Min:85, Max:101   Pulse Pulse  Av.5  Min: 85  Max: 97 Resp Resp  Av  Min: 17  Max: 17 Pulse ox SpO2  Av.3 %  Min: 96 %  Max: 97 %  GENERAL: alert, no distress  NEURO: GCS 15  HEENT: Normocephalic  : deferred  LUNGS: Normal effort with respirations  HEART: Regular rate and rhythm  ABDOMEN: Soft, nondistended, mild tenderness to palpation in right lower quadrant, nonperitoneal no rebound tenderness  EXTREMITY: no cyanosis, clubbing or edema    I/O last 3 completed shifts:  In: 4097.2 [P.O.:450; I.V.:3447.8; IV Piggyback:199.4]  Out: -     Drain/tube output:  In: 4097.2 [P.O.:450; I.V.:3447.8]  Out: -     LAB:  CBC:   Recent Labs     07/10/24  0657 24  0724   WBC 14.8* 7.1   HGB 14.3 12.8*   HCT 42.1 39.0*   MCV 93.1 94.4    121*       BMP:   Recent Labs      07/10/24  0657 07/11/24  0724    140   K 4.0 4.1    108*   CO2 22 24   BUN 15 10   CREATININE 1.1 0.9   GLUCOSE 117* 117*       COAGS: No results for input(s): \"APTT\", \"INR\" in the last 72 hours.    Invalid input(s): \"PROT\"    RADIOLOGY:  Echo (TTE) complete (PRN contrast/bubble/strain/3D)    Result Date: 7/10/2024    Left Ventricle: Normal left ventricular systolic function with a visually estimated EF of 55 - 60%. Left ventricle size is normal. Normal wall thickness. Normal wall motion. Normal diastolic function.   Aortic Valve: Trileaflet valve.   Image quality is adequate.     CT ABDOMEN PELVIS W IV CONTRAST Additional Contrast? None    Result Date: 7/10/2024  EXAMINATION: CT OF THE ABDOMEN AND PELVIS WITH CONTRAST 7/10/2024 7:54 am TECHNIQUE: CT of the abdomen and pelvis was performed with the administration of intravenous contrast. Multiplanar reformatted images are provided for review. Automated exposure control, iterative reconstruction, and/or weight based adjustment of the mA/kV was utilized to reduce the radiation dose to as low as reasonably achievable. COMPARISON: None. HISTORY: ORDERING SYSTEM PROVIDED HISTORY: concern for acute appendicitis TECHNOLOGIST PROVIDED HISTORY: concern for acute appendicitis Decision Support Exception - unselect if not a suspected or confirmed emergency medical condition->Emergency Medical Condition (MA) FINDINGS: Lower Chest: No acute process.  Pacemaker wires.  Coronary atherosclerosis. Organs: Subcentimeter hepatic and renal hypodensities are too small to characterize.  There are scattered areas of calcification along the gallbladder wall.  The spleen, adrenals, pancreas, bile ducts, and stomach are without acute process.  The ureters are nondilated.  The bladder is within normal limits.  Normal prostate.  Trace right hydronephrosis and proximal hydroureter. GI/Bowel: There is abnormal enhancement and dilation of the appendix with extensive adjacent

## 2024-07-14 LAB
EKG ATRIAL RATE: 81 BPM
EKG P AXIS: 27 DEGREES
EKG P-R INTERVAL: 148 MS
EKG Q-T INTERVAL: 382 MS
EKG QRS DURATION: 86 MS
EKG QTC CALCULATION (BAZETT): 443 MS
EKG R AXIS: -20 DEGREES
EKG T AXIS: -28 DEGREES
EKG VENTRICULAR RATE: 81 BPM